# Patient Record
Sex: MALE | Race: WHITE | URBAN - METROPOLITAN AREA
[De-identification: names, ages, dates, MRNs, and addresses within clinical notes are randomized per-mention and may not be internally consistent; named-entity substitution may affect disease eponyms.]

---

## 2017-06-08 ENCOUNTER — OFFICE VISIT (OUTPATIENT)
Dept: OPTOMETRY | Facility: CLINIC | Age: 52
End: 2017-06-08
Payer: COMMERCIAL

## 2017-06-08 DIAGNOSIS — H26.9 CATARACTS, BOTH EYES: ICD-10-CM

## 2017-06-08 DIAGNOSIS — H52.03 HYPEROPIA OF BOTH EYES WITH ASTIGMATISM AND PRESBYOPIA: Primary | ICD-10-CM

## 2017-06-08 DIAGNOSIS — H52.4 HYPEROPIA OF BOTH EYES WITH ASTIGMATISM AND PRESBYOPIA: Primary | ICD-10-CM

## 2017-06-08 DIAGNOSIS — H52.203 HYPEROPIA OF BOTH EYES WITH ASTIGMATISM AND PRESBYOPIA: Primary | ICD-10-CM

## 2017-06-08 PROCEDURE — 92015 DETERMINE REFRACTIVE STATE: CPT | Performed by: OPTOMETRIST

## 2017-06-08 PROCEDURE — 92014 COMPRE OPH EXAM EST PT 1/>: CPT | Performed by: OPTOMETRIST

## 2017-06-08 ASSESSMENT — VISUAL ACUITY
OS_SC+: -2
CORRECTION_TYPE: GLASSES
OD_CC: 20/40
OD_SC: 20/125
OD_CC+: -2
OD_CC: 20/20
OS_CC: 20/20
METHOD: SNELLEN - LINEAR
OS_SC: 20/60
OD_SC: 20/200
OS_SC: 20/200
OS_CC: 20/60

## 2017-06-08 ASSESSMENT — REFRACTION_MANIFEST
OD_AXIS: 012
OD_CYLINDER: +1.50
OS_ADD: +2.25
OD_SPHERE: +1.50
OS_CYLINDER: +1.00
OS_SPHERE: +1.75
OS_AXIS: 175
OD_ADD: +2.25

## 2017-06-08 ASSESSMENT — REFRACTION_WEARINGRX
OS_AXIS: 175
OD_SPHERE: +1.50
OS_ADD: +2.00
OS_SPHERE: +1.25
OS_CYLINDER: +1.00
OD_CYLINDER: +1.25
SPECS_TYPE: BIFOCAL
OD_ADD: +2.00
OD_AXIS: 010

## 2017-06-08 ASSESSMENT — SLIT LAMP EXAM - LIDS
COMMENTS: NORMAL
COMMENTS: NORMAL

## 2017-06-08 ASSESSMENT — TONOMETRY
OS_IOP_MMHG: 12
OD_IOP_MMHG: 12
IOP_METHOD: APPLANATION

## 2017-06-08 ASSESSMENT — CUP TO DISC RATIO
OD_RATIO: 0.3
OS_RATIO: 0.3

## 2017-06-08 ASSESSMENT — EXTERNAL EXAM - RIGHT EYE: OD_EXAM: NORMAL

## 2017-06-08 ASSESSMENT — EXTERNAL EXAM - LEFT EYE: OS_EXAM: NORMAL

## 2017-06-08 ASSESSMENT — CONF VISUAL FIELD
OD_NORMAL: 1
OS_NORMAL: 1

## 2017-06-08 NOTE — MR AVS SNAPSHOT
"              After Visit Summary   6/8/2017    Russ Gilman    MRN: [id number]           Patient Information     Date Of Birth          1965        Visit Information        Provider Department      6/8/2017 1:00 PM Lauryn Gardner OD [facility]        Today's Diagnoses     Hyperopia of both eyes with astigmatism and presbyopia    -  1    Cataracts, both eyes          Care Instructions        A final glasses prescription was given.  Allow time for adaptation.  The glasses may cause dizziness and affect depth perception for awhile.  Monitor cataracts by having yearly exams.  Wear sunglasses when outside.  Return to clinic 1 year for Comprehensive Vision Exam      Lauryn Gardner O.D  [facility]  [address] [place] [address]. [place]  [address]    [phone]                  Follow-ups after your visit        Follow-up notes from your care team     Return in about 1 year (around 6/8/2018) for Eye Exam.      Who to contact     If you have questions or need follow up information about today's clinic visit or your schedule please contact [facility] directly at [phone].  Normal or non-critical lab and imaging results will be communicated to you by MyChart, letter or phone within 4 business days after the clinic has received the results. If you do not hear from us within 7 days, please contact the clinic through [employer] or phone. If you have a critical or abnormal lab result, we will notify you by phone as soon as possible.  Submit refill requests through [employer] or call your pharmacy and they will forward the refill request to us. Please allow 3 business days for your refill to be completed.          Additional Information About Your Visit        MyChart Information     [employer] lets you send messages to your doctor, view your test results, renew your prescriptions, schedule appointments and more. To sign up, go to [email or web address] . Click on \"Log in\" on " "the left side of the screen, which will take you to the Welcome page. Then click on \"Sign up Now\" on the right side of the page.     You will be asked to enter the access code listed below, as well as some personal information. Please follow the directions to create your username and password.     Your access code is: [email or web address]  Expires: 2017  2:12 PM     Your access code will  in 90 days. If you need help or a new code, please call your [place] clinic or [phone].        Care EveryWhere ID     This is your Care EveryWhere ID. This could be used by other organizations to access your [place] medical records  [id number]         Blood Pressure from Last 3 Encounters:   12 152/100   10/24/11 132/86   07/13/10 146/96    Weight from Last 3 Encounters:   12 (!) 156 kg (344 lb)   10/24/11 (!) 153.8 kg (339 lb)   07/13/10 (!) 147.9 kg (326 lb)              We Performed the Following     EYE EXAM (SIMPLE-NONBILLABLE)     REFRACTION        Primary Care Provider Office Phone # Fax #    Go Dejesus [phone]       [facility] [address] [place] [address]        Thank you!     Thank you for choosing [facility]  for your care. Our goal is always to provide you with excellent care. Hearing back from our patients is one way we can continue to improve our services. Please take a few minutes to complete the written survey that you may receive in the mail after your visit with us. Thank you!             Your Updated Medication List - Protect others around you: Learn how to safely use, store and throw away your medicines at www.disposemymeds.org.          This list is accurate as of: 17  2:12 PM.  Always use your most recent med list.                   Brand Name Dispense Instructions for use    aspirin 325 MG EC tablet      1 tablet daily       atorvastatin 40 MG tablet    LIPITOR    90 tablet    Take 1 tablet by mouth daily.       " Carboxymeth-Glycerin-Polysorb 0.5-1-0.5 % Soln    REFRESH OPTIVE ADVANCED    1 Bottle    Apply 1 drop to eye 4 times daily as needed       cyclobenzaprine 10 MG tablet    FLEXERIL    30 tablet    Take 1 tablet by mouth 3 times daily as needed for muscle spasms.       hydrochlorothiazide 25 MG tablet    HYDRODIURIL    90 tablet    Take 1 tablet by mouth daily.       ibuprofen 200 MG tablet    ADVIL/MOTRIN     800-1200mg daily       lisinopril 20 MG tablet    PRINIVIL/ZESTRIL    90 tablet    Take 1 tablet by mouth daily.       omeprazole 20 MG CR capsule    priLOSEC    180 capsule    Take 2 capsules by mouth daily.       PARoxetine 30 MG tablet    PAXIL    90 tablet    Take 1 tablet by mouth daily.       ZOLOFT PO      Take by mouth daily

## 2017-06-08 NOTE — PATIENT INSTRUCTIONS
A final glasses prescription was given.  Allow time for adaptation.  The glasses may cause dizziness and affect depth perception for awhile.  Monitor cataracts by having yearly exams.  Wear sunglasses when outside.  Return to clinic 1 year for Comprehensive Vision Exam      Lauryn Gardner O.D  [address]  [place]  [address]    [phone]

## 2018-05-31 ENCOUNTER — OFFICE VISIT (OUTPATIENT)
Dept: OPTOMETRY | Facility: CLINIC | Age: 53
End: 2018-05-31
Payer: COMMERCIAL

## 2018-05-31 DIAGNOSIS — H52.203 HYPEROPIA OF BOTH EYES WITH ASTIGMATISM AND PRESBYOPIA: Primary | ICD-10-CM

## 2018-05-31 DIAGNOSIS — D22.10 NEVUS OF EYELID, RIGHT: ICD-10-CM

## 2018-05-31 DIAGNOSIS — H52.4 HYPEROPIA OF BOTH EYES WITH ASTIGMATISM AND PRESBYOPIA: Primary | ICD-10-CM

## 2018-05-31 DIAGNOSIS — H52.03 HYPEROPIA OF BOTH EYES WITH ASTIGMATISM AND PRESBYOPIA: Primary | ICD-10-CM

## 2018-05-31 DIAGNOSIS — H25.813 COMBINED FORMS OF AGE-RELATED CATARACT OF BOTH EYES: ICD-10-CM

## 2018-05-31 PROCEDURE — 92014 COMPRE OPH EXAM EST PT 1/>: CPT | Performed by: OPTOMETRIST

## 2018-05-31 PROCEDURE — 92015 DETERMINE REFRACTIVE STATE: CPT | Performed by: OPTOMETRIST

## 2018-05-31 ASSESSMENT — REFRACTION_MANIFEST
OD_ADD: +2.25
OS_SPHERE: +1.50
OS_SPHERE: +1.50
OD_SPHERE: +2.00
OS_AXIS: 170
OD_CYLINDER: +1.50
OS_ADD: +2.25
METHOD_AUTOREFRACTION: 1
OS_CYLINDER: +1.25
OD_CYLINDER: +2.00
OS_CYLINDER: +2.00
OS_AXIS: 168
OD_SPHERE: +1.75
OD_AXIS: 009
OD_AXIS: 017

## 2018-05-31 ASSESSMENT — VISUAL ACUITY
CORRECTION_TYPE: GLASSES
OD_SC: 20/100
METHOD: SNELLEN - LINEAR
OD_CC: 20/30
OS_SC: 20/100
OD_CC: 20/30
OS_CC: 20/20
OS_SC+: -1
OS_CC: 20/40

## 2018-05-31 ASSESSMENT — REFRACTION_WEARINGRX
OS_CYLINDER: +1.00
OS_ADD: +2.25
OS_AXIS: 175
OD_CYLINDER: +1.50
OS_SPHERE: +1.75
OD_ADD: +2.25
OD_AXIS: 012
SPECS_TYPE: BIFOCAL
OD_SPHERE: +1.50

## 2018-05-31 ASSESSMENT — TONOMETRY
IOP_METHOD: APPLANATION
OD_IOP_MMHG: 13
OS_IOP_MMHG: 13

## 2018-05-31 ASSESSMENT — CUP TO DISC RATIO
OS_RATIO: 0.3
OD_RATIO: 0.3

## 2018-05-31 ASSESSMENT — SLIT LAMP EXAM - LIDS: COMMENTS: NORMAL

## 2018-05-31 ASSESSMENT — CONF VISUAL FIELD
OS_NORMAL: 1
METHOD: COUNTING FINGERS
OD_NORMAL: 1

## 2018-05-31 ASSESSMENT — EXTERNAL EXAM - RIGHT EYE: OD_EXAM: NORMAL

## 2018-05-31 ASSESSMENT — EXTERNAL EXAM - LEFT EYE: OS_EXAM: NORMAL

## 2018-05-31 NOTE — PROGRESS NOTES
Chief Complaint   Patient presents with     COMPREHENSIVE EYE EXAM         Last Eye Exam: 6/8/2017  Dilated Previously: Yes    What are you currently using to see?  glasses       Distance Vision Acuity: Not satisfied    Near Vision Acuity: Not satisfied     Eye Comfort: good  Do you use eye drops? : Yes: Occassional OTC drop  Occupation or Hobbies: hunting and in front of the computer    Jelena Azar  [employer]            Medical, surgical and family histories reviewed and updated 5/31/2018.       OBJECTIVE: See Ophthalmology exam    ASSESSMENT:    ICD-10-CM    1. Hyperopia of both eyes with astigmatism and presbyopia H52.03 EYE EXAM (SIMPLE-NONBILLABLE)    H52.203 REFRACTION    H52.4    2. Combined forms of age-related cataract of both eyes H25.813 EYE EXAM (SIMPLE-NONBILLABLE)   3. Nevus of eyelid, right D22.11 DERMATOLOGY REFERRAL      PLAN:     Patient Instructions   See dermatology to check nevus superior lid.    Try to eat fruits and vegetables daily to increase macular pigmentation.    Your eyes may be blurry at near and sensitive to light for several hours from the dilating drops.    Yearly eye exams recommended.    Thank you!

## 2018-05-31 NOTE — LETTER
5/31/2018         RE: Russ Gilman  [address]  [place] [address]        Dear Colleague,    Thank you for referring your patient, Russ Gilman, to the [facility]. Please see a copy of my visit note below.    Chief Complaint   Patient presents with     COMPREHENSIVE EYE EXAM         Last Eye Exam: 6/8/2017  Dilated Previously: Yes    What are you currently using to see?  glasses       Distance Vision Acuity: Not satisfied    Near Vision Acuity: Not satisfied     Eye Comfort: good  Do you use eye drops? : Yes: Occassional OTC drop  Occupation or Hobbies: hunting and in front of the [employer]    Jelena [employer]            Medical, surgical and family histories reviewed and updated 5/31/2018.       OBJECTIVE: See Ophthalmology exam    ASSESSMENT:    ICD-10-CM    1. Hyperopia of both eyes with astigmatism and presbyopia H52.03 EYE EXAM (SIMPLE-NONBILLABLE)    H52.203 REFRACTION    H52.4    2. Combined forms of age-related cataract of both eyes H25.813 EYE EXAM (SIMPLE-NONBILLABLE)   3. Nevus of eyelid, right D22.11 DERMATOLOGY REFERRAL      PLAN:     Patient Instructions   See dermatology to check nevus superior lid.    Try to eat fruits and vegetables daily to increase macular pigmentation.    Your eyes may be blurry at near and sensitive to light for several hours from the dilating drops.    Yearly eye exams recommended.    Thank you!         Again, thank you for allowing me to participate in the care of your patient.        Sincerely,        Farideh Arriaga OD

## 2018-05-31 NOTE — PATIENT INSTRUCTIONS
See dermatology to check nevus superior lid.    Try to eat fruits and vegetables daily to increase macular pigmentation.    Your eyes may be blurry at near and sensitive to light for several hours from the dilating drops.    Yearly eye exams recommended.    Thank you!

## 2018-05-31 NOTE — MR AVS SNAPSHOT
After Visit Summary   5/31/2018    Russ Gilman    MRN: [id number]           Patient Information     Date Of Birth          1965        Visit Information        Provider Department      5/31/2018 11:40 AM Farideh Arriaga OD [facility]        Today's Diagnoses     Hyperopia of both eyes with astigmatism and presbyopia    -  1    Combined forms of age-related cataract of both eyes        Nevus of eyelid, right          Care Instructions    See dermatology to check nevus superior lid.    Try to eat fruits and vegetables daily to increase macular pigmentation.    Your eyes may be blurry at near and sensitive to light for several hours from the dilating drops.    Yearly eye exams recommended.    Thank you!          Follow-ups after your visit        Additional Services     DERMATOLOGY REFERRAL       Your provider has referred you to: [facility]: [facility] [phone]   [email or web address]    Please be aware that coverage of these services is subject to the terms and limitations of your health insurance plan.  Call member services at your health plan with any benefit or coverage questions.      Please bring the following with you to your appointment:    (1) Any X-Rays, CTs or MRIs which have been performed.  Contact the facility where they were done to arrange for  prior to your scheduled appointment.    (2) List of current medications  (3) This referral request   (4) Any documents/labs given to you for this referral                  Who to contact     If you have questions or need follow up information about today's clinic visit or your schedule please contact [facility] directly at [phone].  Normal or non-critical lab and imaging results will be communicated to you by MyChart, letter or phone within 4 business days after the clinic has received the results. If you  "do not hear from us within 7 days, please contact the clinic through [employer] or phone. If you have a critical or abnormal lab result, we will notify you by phone as soon as possible.  Submit refill requests through [employer] or call your pharmacy and they will forward the refill request to us. Please allow 3 business days for your refill to be completed.          Additional Information About Your Visit        [employer] Information     [employer] lets you send messages to your doctor, view your test results, renew your prescriptions, schedule appointments and more. To sign up, go to [email or web address] . Click on \"Log in\" on the left side of the screen, which will take you to the Welcome page. Then click on \"Sign up Now\" on the right side of the page.     You will be asked to enter the access code listed below, as well as some personal information. Please follow the directions to create your username and password.     Your access code is: [email or web address]  Expires: 2018  1:13 PM     Your access code will  in 90 days. If you need help or a new code, please call your [place] clinic or [phone].        Care EveryWhere ID     This is your Care EveryWhere ID. This could be used by other organizations to access your [facility] medical records  [id number]         Blood Pressure from Last 3 Encounters:   12 152/100   10/24/11 132/86   07/13/10 146/96    Weight from Last 3 Encounters:   12 (!) 156 kg (344 lb)   10/24/11 (!) 153.8 kg (339 lb)   07/13/10 (!) 147.9 kg (326 lb)              We Performed the Following     DERMATOLOGY REFERRAL     EYE EXAM (SIMPLE-NONBILLABLE)     REFRACTION        Primary Care Provider Office Phone # Fax #    Go Dejesus [phone]       [facility] [address]  [place] [address]        Equal Access to Services     RANCHO BARBOZA AH: Hadii aad ku hadasho Soomaali, waaxda luqadaha, qaybta kaalmalesia webster, sheeba elizabeth " ludamelissavinny callahanDavidliana ah. So [facility] [phone].    ATENCIÓN: Si german harris, tiene a rabago disposición servicios gratuitos de asistencia lingüística. John durant [phone].    We comply with applicable federal civil rights laws and [place] laws. We do not discriminate on the basis of race, color, national origin, age, disability, sex, sexual orientation, or gender identity.            Thank you!     Thank you for choosing [facility]  for your care. Our goal is always to provide you with excellent care. Hearing back from our patients is one way we can continue to improve our services. Please take a few minutes to complete the written survey that you may receive in the mail after your visit with us. Thank you!             Your Updated Medication List - Protect others around you: Learn how to safely use, store and throw away your medicines at www.disposemymeds.org.          This list is accurate as of 5/31/18  1:13 PM.  Always use your most recent med list.                   Brand Name Dispense Instructions for use Diagnosis    aspirin 325 MG EC tablet      1 tablet daily        atorvastatin 40 MG tablet    LIPITOR    90 tablet    Take 1 tablet by mouth daily.    Hyperlipidemia LDL goal < 130       Carboxymeth-Glycerin-Polysorb 0.5-1-0.5 % Soln    REFRESH OPTIVE ADVANCED    1 Bottle    Apply 1 drop to eye 4 times daily as needed    Dry eyes, bilateral       cyclobenzaprine 10 MG tablet    FLEXERIL    30 tablet    Take 1 tablet by mouth 3 times daily as needed for muscle spasms.    Low back pain       hydrochlorothiazide 25 MG tablet    HYDRODIURIL    90 tablet    Take 1 tablet by mouth daily.    Hypertension goal BP (blood pressure) < 140/90       ibuprofen 200 MG tablet    ADVIL/MOTRIN     800-1200mg daily        lisinopril 20 MG tablet    PRINIVIL/ZESTRIL    90 tablet    Take 1 tablet by mouth daily.        omeprazole 20 MG CR capsule    priLOSEC    180 capsule    Take 2 capsules by mouth daily.    GERD  (gastroesophageal reflux disease)       PARoxetine 30 MG tablet    PAXIL    90 tablet    Take 1 tablet by mouth daily.    Anxiety       ZOLOFT PO      Take by mouth daily